# Patient Record
Sex: FEMALE | Race: WHITE | Employment: OTHER | URBAN - METROPOLITAN AREA
[De-identification: names, ages, dates, MRNs, and addresses within clinical notes are randomized per-mention and may not be internally consistent; named-entity substitution may affect disease eponyms.]

---

## 2018-08-15 ENCOUNTER — HOSPITAL ENCOUNTER (OUTPATIENT)
Dept: MAMMOGRAPHY | Age: 58
Discharge: HOME | End: 2018-08-15
Attending: FAMILY MEDICINE
Payer: SELF-PAY

## 2018-08-15 DIAGNOSIS — Z12.39 SCREENING BREAST EXAMINATION: ICD-10-CM

## 2018-08-15 PROCEDURE — 77067 SCR MAMMO BI INCL CAD: CPT

## 2019-09-20 ENCOUNTER — IMPORTED ENCOUNTER (OUTPATIENT)
Dept: URBAN - NONMETROPOLITAN AREA CLINIC 1 | Facility: CLINIC | Age: 59
End: 2019-09-20

## 2019-09-20 PROBLEM — H25.13: Noted: 2020-10-26

## 2019-09-20 PROBLEM — H52.4: Noted: 2019-09-20

## 2019-09-20 PROCEDURE — S0620 ROUTINE OPHTHALMOLOGICAL EXA: HCPCS

## 2019-09-20 PROCEDURE — 92310 CONTACT LENS FITTING OU: CPT

## 2019-09-20 NOTE — PATIENT DISCUSSION
Presbyopia OUDiscussed refractive status in detail with patient. New glasses Rx given today. Will order CL trials and have patient pick them up once they arrive. Continue to monitor.

## 2020-10-26 ENCOUNTER — IMPORTED ENCOUNTER (OUTPATIENT)
Dept: URBAN - NONMETROPOLITAN AREA CLINIC 1 | Facility: CLINIC | Age: 60
End: 2020-10-26

## 2020-10-26 PROBLEM — H25.13: Noted: 2020-10-26

## 2020-10-26 PROBLEM — H52.4: Noted: 2019-09-20

## 2020-10-26 PROCEDURE — 92310 CONTACT LENS FITTING OU: CPT

## 2020-10-26 PROCEDURE — S0621 ROUTINE OPHTHALMOLOGICAL EXA: HCPCS

## 2022-04-09 ASSESSMENT — VISUAL ACUITY
OS_SC: 20/30-2
OS_SC: 20/30
OS_CC: 20/50
OD_PH: 20/30+
OS_GLARE: 20/25
OD_SC: 20/40
OD_CC: 20/20-
OD_SC: 20/20-
OS_SC: 20/50
OD_SC: 20/40-2
OU_SC: 20/20
OU_SC: 20/25-
OU_CC: 20/20
OD_GLARE: 20/25
OS_CC: 20/30

## 2022-04-09 ASSESSMENT — TONOMETRY
OD_IOP_MMHG: 18
OS_IOP_MMHG: 20
OD_IOP_MMHG: 20
OS_IOP_MMHG: 17

## 2022-04-09 ASSESSMENT — KERATOMETRY
OD_K1POWER_DIOPTERS: 44.75
OS_K2POWER_DIOPTERS: 45.75
OD_K2POWER_DIOPTERS: 46.00
OD_AXISANGLE_DEGREES: 25
OS_AXISANGLE_DEGREES: 167
OS_K1POWER_DIOPTERS: 44.50

## 2022-04-14 ENCOUNTER — COMPREHENSIVE EXAM (OUTPATIENT)
Dept: URBAN - NONMETROPOLITAN AREA CLINIC 1 | Facility: CLINIC | Age: 62
End: 2022-04-14

## 2022-04-14 DIAGNOSIS — H52.4: ICD-10-CM

## 2022-04-14 PROCEDURE — S0621 ROUTINE OPHTHALMOLOGICAL EXA: HCPCS

## 2022-04-14 PROCEDURE — 92310-E CONTACT LENS FITTING ESTABLISH PATIENT

## 2022-04-14 ASSESSMENT — VISUAL ACUITY
OD_CC: 20/25
OU_CC: 20/20
OS_CC: 20/20

## 2022-04-14 ASSESSMENT — KERATOMETRY
OD_K2POWER_DIOPTERS: 46.00
OD_AXISANGLE_DEGREES: 25
OS_AXISANGLE_DEGREES: 167
OS_K1POWER_DIOPTERS: 44.50
OD_K1POWER_DIOPTERS: 44.75
OS_K2POWER_DIOPTERS: 45.75

## 2022-04-14 ASSESSMENT — TONOMETRY
OD_IOP_MMHG: 16
OS_IOP_MMHG: 15

## 2022-04-14 NOTE — PATIENT DISCUSSION
Discussed diagnosis in detail with patient. Reviewed symptoms related to cataract progression. Recommend refer to Dr. Anaya Brown for cataract evaluation. Patient defers at this time would like to wait until she has Medicare since she has no medical insurance. Continue to monitor.

## 2023-04-17 ENCOUNTER — ESTABLISHED PATIENT (OUTPATIENT)
Dept: URBAN - NONMETROPOLITAN AREA CLINIC 1 | Facility: CLINIC | Age: 63
End: 2023-04-17

## 2023-04-17 DIAGNOSIS — H52.4: ICD-10-CM

## 2023-04-17 PROCEDURE — 92310-E CONTACT LENS FITTING ESTABLISH PATIENT

## 2023-04-17 PROCEDURE — S0621 ROUTINE OPHTHALMOLOGICAL EXA: HCPCS

## 2023-04-17 ASSESSMENT — KERATOMETRY
OS_K2POWER_DIOPTERS: 45.75
OS_AXISANGLE_DEGREES: 167
OD_AXISANGLE_DEGREES: 25
OD_K1POWER_DIOPTERS: 44.75
OS_K1POWER_DIOPTERS: 44.50
OD_K2POWER_DIOPTERS: 46.00

## 2023-04-17 ASSESSMENT — TONOMETRY
OS_IOP_MMHG: 16
OD_IOP_MMHG: 16

## 2023-04-17 ASSESSMENT — VISUAL ACUITY
OU_CC: 20/29
OS_CC: 20/29
OD_CC: 20/29

## 2025-02-13 ENCOUNTER — COMPREHENSIVE EXAM (OUTPATIENT)
Age: 65
End: 2025-02-13

## 2025-02-13 DIAGNOSIS — H52.4: ICD-10-CM

## 2025-02-13 PROCEDURE — 92310-1 LEVEL 1 SOFT LENS UPDATE

## 2025-02-13 PROCEDURE — S0621AEC ROUTINE OPH EXAM INCLUDES REF/ EST PATIENT
